# Patient Record
(demographics unavailable — no encounter records)

---

## 2024-10-08 NOTE — ASSESSMENT
[FreeTextEntry1] : Marva is a 12 year old male who is 6 weeks post op from his drainage of a perianal abscess with seton placement. EUA revealed an external sinus, probing revealed a submucosal fistula tract in the L lateral canal, some expression of pus. Seton placed into the tract and secured with silk ties. He is doing well since the procedure and continues to have drainage from the site. I counseled Marva and his mother and informed them that the seton appears intact without any concerns upon exam today along with a new open area in the right buttock that is draining pus. I explained the goal of allowing the wounds to drain and heal to avoid developing a significant fluid collection in the future. I have advised Marva to take warm showers with the water directly hitting the affected regions until he is able to perform warm soaks in the bath at home 1-2 daily. I will also prescribe the family a new 10-day course of antibiotics to remain compliant with at home. After our discussion, Marva and mom indicated their understanding and are in agreement with the discussed plan. We will follow up in two weeks after the MRE for a wound check and to review the results. They have my information and know to contact me sooner with any questions or concerns.

## 2024-10-08 NOTE — REASON FOR VISIT
[Patient] : patient [Mother] : mother [Medical Records] : medical records [____ Week(s)] : [unfilled] week(s)  [Other: ____] : [unfilled] [de-identified] : 8-27-24 [de-identified] : Dr Lee [de-identified] : Marva is a 12 year old male who is here today for follow up. He is 6 weeks post op from drainage of perianal abscess with seton placement. EUA revealed an external sinus, probing revealed a submucosal fistula tract in the L lateral canal, some expression of pus. Seton placed into the tract and secured with silk ties. MRE/MRI done inpt not concerning for IBD, A 7.1 x 2.1 x 5.4 cm left perianal abscess which extends to the left medial gluteal fold was noted with no definite tract or fistula was identified on this study. He has been doing well since the procedure and reports persistent drainage from the site, which has improved overall. He has not had any fevers or changes in energy levels. His bowel movements remain normal without any issues.

## 2024-10-08 NOTE — CONSULT LETTER
[Dear  ___] : Dear  [unfilled], [Courtesy Letter:] : I had the pleasure of seeing your patient, [unfilled], in my office today. [Please see my note below.] : Please see my note below. [Consult Closing:] : Thank you very much for allowing me to participate in the care of this patient.  If you have any questions, please do not hesitate to contact me. [Sincerely,] : Sincerely, [FreeTextEntry2] : Dr Rubio [FreeTextEntry3] : Myriam Lee MD  Division of Pediatric, General, Thoracic and Endoscopic Surgery  [facility]

## 2024-10-08 NOTE — ADDENDUM
[FreeTextEntry1] : Documented by Juan Manuel Spangler acting as a scribe for Dr. Lee on 10/08/2024.   All medical record entries made by the Scribe were at my, Dr. Lee, direction and personally dictated by me on 10/08/2024. I have reviewed the chart and agree that the record accurately reflects my personal performances of the history, physical exam, assessment and plan. I have also personally directed, reviewed, and agree with the instructions.

## 2024-10-08 NOTE — PHYSICAL EXAM
[NL] : grossly intact [TextBox_59] : 1 x 3 cm patch of granulation tissue appreciated on the left medial butt cheek inferior to the seton, on the right medial buttock there is a 5 mm opening that draining pus, seton in place

## 2024-10-30 NOTE — PHYSICAL EXAM
[NL] : grossly intact [TextBox_59] : 1 x 3 cm patch of granulation tissue appreciated on the left medial buttock inferior to the seton, seton in place on the left side, on the right medial buttock there is a 5 mm opening that draining pus (no seton in this location).

## 2024-10-30 NOTE — ADDENDUM
[FreeTextEntry1] : Documented by Juan Manuel Spangler acting as a scribe for Dr. Lee on 10/21/2024.   All medical record entries made by the Scribe were at my, Dr. Lee, direction and personally dictated by me on 10/21/2024. I have reviewed the chart and agree that the record accurately reflects my personal performances of the history, physical exam, assessment and plan. I have also personally directed, reviewed, and agree with the instructions.

## 2024-10-30 NOTE — CONSULT LETTER
[Dear  ___] : Dear  [unfilled], [Consult Letter:] : I had the pleasure of evaluating your patient, [unfilled]. [Please see my note below.] : Please see my note below. [Consult Closing:] : Thank you very much for allowing me to participate in the care of this patient.  If you have any questions, please do not hesitate to contact me. [Sincerely,] : Sincerely, [FreeTextEntry2] : Jermaine Rubio MD [FreeTextEntry3] : Myriam Lee MD Division of Pediatric, General, Thoracic and Endoscopic Surgery [facility]

## 2024-10-30 NOTE — CONSULT LETTER
[Dear  ___] : Dear  [unfilled], [Consult Letter:] : I had the pleasure of evaluating your patient, [unfilled]. [Please see my note below.] : Please see my note below. [Consult Closing:] : Thank you very much for allowing me to participate in the care of this patient.  If you have any questions, please do not hesitate to contact me. [Sincerely,] : Sincerely, [FreeTextEntry2] : Jermaine Rubio MD [FreeTextEntry3] : Myriam Lee MD Division of Pediatric, General, Thoracic and Endoscopic Surgery [facility]

## 2024-10-30 NOTE — REASON FOR VISIT
[Patient] : patient [Mother] : mother [Other: ____] : [unfilled] [____ Month(s)] : [unfilled] month(s)  [Medical Records] : medical records [de-identified] : 08/27/24 [de-identified] : Dr. Lee [de-identified] : Marva is a 12 year old male who underwent a drainage of perianal abscess with seton placement on 8/27/24, here today to follow up after a new opening was recently appreciated in his right buttock two weeks ago. Since then, the family has completed the MRE and Marva has completed the course of antibiotics (cipro and flagyl) that was prescribed. He denies any significant pain but notes persistent purulent drainage. He has not had any fevers or emesis.

## 2024-10-30 NOTE — REASON FOR VISIT
[Patient] : patient [Mother] : mother [Other: ____] : [unfilled] [____ Month(s)] : [unfilled] month(s)  [Medical Records] : medical records [de-identified] : 08/27/24 [de-identified] : Dr. Lee [de-identified] : Marva is a 12 year old male who underwent a drainage of perianal abscess with seton placement on 8/27/24, here today to follow up after a new opening was recently appreciated in his right buttock two weeks ago. Since then, the family has completed the MRE and Marva has completed the course of antibiotics (cipro and flagyl) that was prescribed. He denies any significant pain but notes persistent purulent drainage. He has not had any fevers or emesis.

## 2024-10-30 NOTE — ASSESSMENT
[FreeTextEntry1] : Marva is a 12 year old male who underwent a drainage of perianal abscess with seton placement on 8/27/24. At this time, Marva continues to have purulent drainage from his perineal area. I explained to mom and Marva that the perianal exam appears to be unchanged in comparison to their last visit on 10/8 and it is unclear whether there is an opening within the granulation tissue on the left medial buttock. The opening on the right buttock remains unchanged. I again reviewed the goal of allowing all wounds to drain completely and heal, but informed mom that a repeat EUA with possible additional seton placement may be warranted to further examine the wounds. I will wait to receive a final read for the recent MRE completed before reaching out to mom later this week to discuss next steps. Mom indicated her understanding and we will be in touch. She has my information and knows to contact me sooner with any questions or concerns.

## 2024-10-31 NOTE — HISTORY OF PRESENT ILLNESS
[de-identified] : Marva is a 12-year-old male with sickle cell trait newly diagnosed with perianal Crohn's disease.  Marva presented to [facility] in [place] with a perianal abscess. MRI with IV contrast (no oral contrast) with a 7.1 x 2.1 x 5.4 cm left perianal abscess which extended to the left medial gluteal fold with no definite tract or fistula. Underwent drainage with seton placement in submucosal fistula tract in the L lateral canal. During admission CBC with WBC elevated to 14, anemic to 10.6. CRP elevated to 81. Discharged home on Augmentin. Culture grew staph lugdunensis, resistant to penicillin and transitioned to Bactrim x2 weeks. Underwent EGD/colonoscopy on 24 with patchy erythema in the stomach and scattered aphthous ulcers in the transverse colon. Histopathology unremarkable. Repeat CRP 16. CMP with albumin 3.9. Saw surgery again on 10/8 with continued drainage from left perianal area and a new opening on the right buttock draining purulent fluid. Restarted 10-day course of cipro flagyl. MRE on 10/18 with trace wall thickening and mucosal enhancement of the terminal ileum with evidence of diffusion restriction and mild periileal fibrofatty proliferation. Significant resolution of the left perianal abscess with seton in place which near circumferentially surrounds the anus. There is an intersphincteric enhancing tract which originates at 2:00 and extends caudad 3 cm to the left  crease. A 1 x 0.2 cm fluid collection is present.  Currently with daily drainage in his underwear. Has pain when sitting through school but not missing school. Stools daily [place] 3-4, sometimes sees blood with wiping. No abdominal pain or emesis. Eating well without issue. No unexplained fevers, mouth sores, rashes, or joint pain. No family history of IBD or autoimmune diseaes.

## 2024-10-31 NOTE — PHYSICAL EXAM
[Well Nourished] : well nourished [NAD] : in no acute distress [EOMI] : ~T the extraocular movements were normal and intact [Moist & Pink Mucous Membranes] : moist and pink mucous membranes [CTAB] : lungs clear to auscultation bilaterally [Regular Rate and Rhythm] : regular rate and rhythm [Normal S1, S2] : normal S1 and S2 [Soft] : soft  [Normal Bowel Sounds] : normal bowel sounds [No HSM] : no hepatosplenomegaly appreciated [Normal Tone] : normal tone [Well-Perfused] : well-perfused [Interactive] : interactive [icteric] : anicteric [Oral Ulcers] : no oral ulcers [Respiratory Distress] : no respiratory distress  [Distended] : non distended [Tender] : non tender [Edema] : no edema [Cyanosis] : no cyanosis [Rash] : no rash [Jaundice] : no jaundice [de-identified] : +seton on left side, granulation tissue on left medial buttock, small opening on right medial buttock

## 2024-10-31 NOTE — CONSULT LETTER
Home Sleep Study.  Gave verbal instructions on how to use device and provided written instructions.  Advised pt to call 911 in the event of a medical emergency and [facility] using [facility].  Pt understood and stated they would return the device tomorrow by  0900    [Dear  ___] : Dear  [unfilled], [Consult Letter:] : I had the pleasure of evaluating your patient, [unfilled]. [Please see my note below.] : Please see my note below. [Consult Closing:] : Thank you very much for allowing me to participate in the care of this patient.  If you have any questions, please do not hesitate to contact me. [Sincerely,] : Sincerely, [FreeTextEntry3] : MD Keegan Gaming MD MS [facility]

## 2024-10-31 NOTE — REASON FOR VISIT
[Post Hospitalization Consult] : a post hospitalization consult [Patient] : patient [Parents] : parents [Medical Records] : medical records [FreeTextEntry2] : Crohn's Disease

## 2024-10-31 NOTE — REVIEW OF SYSTEMS
[Fever] : no fever [Redness] : no redness [Discharge] : no discharge [Nasal Discharge] : no nasal discharge [Sore Throat] : no sore throat [Shortness Of Breath] : no shortness of breath [Cough] : no cough [Chest Pain] : no chest pain [Edema] : no edema [Joint Pain] : no joint pain [Frequent Infections] : no frequent infections [Rash] : no rash

## 2024-10-31 NOTE — ASSESSMENT
[Educated Patient & Family about Diagnosis] : educated the patient and family about the diagnosis [FreeTextEntry1] : Marva is a 12-year-old male with newly diagnosed ileal and perianal fistulizing Crohn's disease.  Today we reviewed the IBD diagnosis in detail including pathophysiology, natural history, important extraintestinal manifestations, and risk of complicated disease behavior over time.  We further discussed the treat to target paradigm of therapy and various treatment options that exist today for IBD.  Of the available treatment options, we discussed anti-TNF medications in more detail including both the benefits and the potential risks including but not limited to allergic reaction, infection, development of other autoimmune phenomena and malignancy.  Importantly, TNF inhibitors used as monotherapy have not been associated with increased risk of developing malignancy such as lymphomas.  We further discussed health maintenance aspects of IBD management, importance of discussing mental health issues that may arise, and plan to discuss nutrition at future visits.    Plan: - Start infliximab 10 mg/kg, will obtain insurance approval  - Will obtain growth charts and vaccination records from PMD - Call for questions, concerns, or worsening symptoms  - Close follow up needed

## 2024-12-19 NOTE — ASSESSMENT
[FreeTextEntry1] : Marva is a 12-year-old male with sickle cell trait newly diagnosed with perianal Crohn's disease on Remicade.  Underwent seton placement for perianal abscess 8-27-24.  He is recovering well.  Counselled the family the plan now is to continue treatment for the Crohn's that will be managed by GI and continue to monitor the perianal area.  Once levels are therapeutic and drainage subsides we will discuss seton removal which can be done at the bedside. But he has to keep the area as clean and dry as possible.  What is concerning to us are new onsets of pain and or pressure in the area from new or undrained collections.  Family is aware to reach out w any similar concerns.  Otherwise, f/u in 2 months w Dr Lee sooner if any concerns.  Dr Lee updated

## 2024-12-19 NOTE — PHYSICAL EXAM
[Erythema] : no erythema [Granulation tissue] : granulation tissue [Drainage] : drainage -  [NL] : moves all extremities x4, warm well perfused x4 [TextBox_59] : seton in place, granulation tissue on right where abscess was, above skin level

## 2024-12-19 NOTE — CONSULT LETTER
[Dear  ___] : Dear  [unfilled], [Courtesy Letter:] : I had the pleasure of seeing your patient, [unfilled], in my office today. [Please see my note below.] : Please see my note below. [Consult Closing:] : Thank you very much for allowing me to participate in the care of this patient.  If you have any questions, please do not hesitate to contact me. [Sincerely,] : Sincerely, [FreeTextEntry2] : Dr Rubio [FreeTextEntry3] : Manju Porter  MSN  CPNP Pediatric Nurse Practitioner Department of Pediatric Surgery [facility] phone [phone] fax [phone]

## 2024-12-19 NOTE — HISTORY OF PRESENT ILLNESS
[FreeTextEntry1] : Marva is a 12-year-old male with sickle cell trait newly diagnosed with perianal Crohn's disease on Remicade.  Underwent seton placement for perianal abscess 8-27-24 when he presented to the ER with the abscess.  HE presents for a f/u visit. Seton remains in place.  Endorses intermittent perianal drainage, denies pain or trouble stooling.  HE is aware that if the seton dislodges do not go to the ER unless they have concerns.  they can call the office and we can make a plan

## 2025-01-14 NOTE — ASSESSMENT
[Educated Patient & Family about Diagnosis] : educated the patient and family about the diagnosis [FreeTextEntry1] : Marva is a 13-year-old male with newly diagnosed ileal and perianal fistulizing Crohn's disease. He is now s/p standard induction of infliximab 10 mg/kg/dose with overall improvement of perianal disease with resolved pain and decreased drainage, however still with small amounts of daily drainage. He has had excellent levels of infliximab and will consider spacing out infusions if levels continue to remain >20 and perianal disease continues to heal.   Plan: - Continue infliximab 10 mg/kg, continue [id number] for now - CBC, CMP, CRP, B12, folate, vitamins A, D, E and coags, iron studies, and infliximab level at time of next infusion - To f/u with surgery next month - F/u in 2 months - Call for questions, concerns, or worsening symptoms

## 2025-01-14 NOTE — CONSULT LETTER
[Dear  ___] : Dear  [unfilled], [Consult Letter:] : I had the pleasure of evaluating your patient, [unfilled]. [Please see my note below.] : Please see my note below. [Consult Closing:] : Thank you very much for allowing me to participate in the care of this patient.  If you have any questions, please do not hesitate to contact me. [Sincerely,] : Sincerely, [FreeTextEntry3] : Tena Pitts MD Fellow in the Division of Gastroenterology, Liver Disease and Nutrition at [facility]

## 2025-01-14 NOTE — PHYSICAL EXAM
[Well Nourished] : well nourished [NAD] : in no acute distress [EOMI] : ~T the extraocular movements were normal and intact [Moist & Pink Mucous Membranes] : moist and pink mucous membranes [CTAB] : lungs clear to auscultation bilaterally [Regular Rate and Rhythm] : regular rate and rhythm [Normal S1, S2] : normal S1 and S2 [Soft] : soft  [Normal Bowel Sounds] : normal bowel sounds [No HSM] : no hepatosplenomegaly appreciated [Normal Tone] : normal tone [Well-Perfused] : well-perfused [Interactive] : interactive [Well Developed] : well developed [PERRL] : pupils were equal, round, reactive to light  [icteric] : anicteric [Oral Ulcers] : no oral ulcers [Respiratory Distress] : no respiratory distress  [Distended] : non distended [Tender] : non tender [Edema] : no edema [Cyanosis] : no cyanosis [Rash] : no rash [Jaundice] : no jaundice [de-identified] : +seton on left side, healing granulation tissue on left medial buttock

## 2025-01-14 NOTE — HISTORY OF PRESENT ILLNESS
[Crohn's Disease] : Crohn's Disease  [Ileum] : ileum [Colon] : colon [Perianal Disease] : The patient has perianal disease. [de-identified] : 2024 [de-identified] : Marva is a 13-year-old male with sickle cell trait recently diagnosed with perianal Crohn's disease.  [place] [facility]ianal [place]. MRI with IV contrast (no oral contrast) with a 7.1 x 2.1 x 5.4 cm left perianal abscess which extended to the left medial gluteal fold with no definite tract or fistula. Underwent drainage with seton placement in submucosal fistula tract in the L lateral canal. During admission CBC with WBC elevated to 14, anemic to 10.6. CRP elevated to 81. Discharged home on Augmentin. Culture grew staph lugdunensis, resistant to penicillin and transitioned to Bactrim x2 weeks. Underwent EGD/colonoscopy on 24 with patchy erythema in the stomach and scattered aphthous ulcers in the transverse colon. Histopathology unremarkable. Repeat CRP 16. CMP with albumin 3.9. Saw surgery again on 10/8 with continued drainage from left perianal area and a new opening on the right buttock draining purulent fluid. Restarted 10-day course of cipro flagyl. MRE on 10/18 with trace wall thickening and mucosal enhancement of the terminal ileum with evidence of diffusion restriction and mild periileal fibrofatty proliferation. Significant resolution of the left perianal abscess with seton in place. There was an intersphincteric enhancing tract which originated at 2:00 and extended caudad 3 cm to the left jesika crease. A 1 x 0.2 cm fluid collection was present. Constellation of findings consistent with Crohn's disease and he was started on infliximab 10 mg/kg/dose (800 mg), first dose given . Level at time of second infusion was 69, level at time of third infusion was 32. He saw surgery again on , with plan to keep seton in place as he continued to have intermittent drainage.   Interval history: He is now s/p standard induction of infliximab, third dose given on . Currently with daily drainage in his underwear however overall improved. He's been more comfortable sitting and no issues with school. Stools daily [place] 4. No longer seeing blood with wiping. No abdominal pain or emesis. Eating well without issue. No unexplained fevers, mouth sores, rashes, or joint pain.

## 2025-02-13 NOTE — PHYSICAL EXAM
[NL] : grossly intact [TextBox_59] : Seton in place, left-sided perianal skin with stellate scar (the scar is overlying the previous area of granulation tissue), this left perianal area is improved in appearance; there is a 5 mm opening in the midline of the perineum with drainage

## 2025-02-13 NOTE — CONSULT LETTER
[Dear  ___] : Dear  [unfilled], [Courtesy Letter:] : I had the pleasure of seeing your patient, [unfilled], in my office today. [Please see my note below.] : Please see my note below. [Consult Closing:] : Thank you very much for allowing me to participate in the care of this patient.  If you have any questions, please do not hesitate to contact me. [Sincerely,] : Sincerely, [FreeTextEntry2] : Dr Rubio [FreeTextEntry3] : Myriam Lee MD  Division of Pediatric, General, Thoracic and Endoscopic Surgery  [facility]

## 2025-02-13 NOTE — ASSESSMENT
[FreeTextEntry1] : Marva is a 13-year-old male with sickle cell trait and recently diagnosed perianal Crohn's disease on Remicade infusions.  He underwent seton placement for a large perianal abscess on 8/27/24. He is doing well with the seton in place and an improvement in the purulent drainage, especially after starting Remicade. On exam, the seton appears to be intact. The left perianal skin appears improved as it is now a stellate scar as opposed to raw granulation tissue.   There is no drainage from the left-sided perianal skin. There is a persistent 5 mm opening in the perinem with drainage.  Given his progress thus far, I reviewed the role of the seton to allow for adequate drainage of his fistula to avoid any worsening infection in the future. Moving forward, I advised the family to remain compliant with the Remicade treatment and to follow up with me in three months for another wound check. They understand and agree to follow up. They have my information and know to contact me sooner with any questions or concerns.

## 2025-02-13 NOTE — ADDENDUM
[FreeTextEntry1] : Documented by Juan Manuel Spangler acting as a scribe for Dr. Lee on 02/13/2025.   All medical record entries made by the Scribe were at my, Dr. Lee, direction and personally dictated by me on 02/13/2025. I have reviewed the chart and agree that the record accurately reflects my personal performances of the history, physical exam, assessment and plan. I have also personally directed, reviewed, and agree with the instructions.

## 2025-02-13 NOTE — REASON FOR VISIT
[Follow-up - Scheduled] : a follow-up, scheduled visit for [Other: ____] : [unfilled] [Patient] : patient [Mother] : mother [Medical Records] : medical records [FreeTextEntry4] : Dr. Jermaine Rubio

## 2025-02-13 NOTE — HISTORY OF PRESENT ILLNESS
[FreeTextEntry1] : Marva is a 13-year-old male with sickle cell trait recently diagnosed with perianal Crohn's disease on Remicade. He had undergone seton placement for perianal abscess 8-27-24 (this was prior to his diagnosis of Crohn's disease). Marva is here for a routine follow up visit. The seton remains in place and he denies any associated pain or discomfort. He still appreciates intermittent drainage from the region, which he and his mother feel is less quantity in general. He is having normal bowel movements daily without any issues. He continues to follow up with Peds GI and is currently getting treatment with Remicade every 4 weeks; next infusion scheduled for 2/28/25.

## 2025-02-13 NOTE — CONSULT LETTER
[Dear  ___] : Dear  [unfilled], [Courtesy Letter:] : I had the pleasure of seeing your patient, [unfilled], in my office today. [Please see my note below.] : Please see my note below. [Consult Closing:] : Thank you very much for allowing me to participate in the care of this patient.  If you have any questions, please do not hesitate to contact me. [Sincerely,] : Sincerely, [FreeTextEntry2] : Dr Rubio [FreeTextEntry3] : Myriam Lee MD  Division of Pediatric, General, Thoracic and Endoscopic Surgery  [facility]

## 2025-03-18 NOTE — ASSESSMENT
[Educated Patient & Family about Diagnosis] : educated the patient and family about the diagnosis [FreeTextEntry1] : Marva is a 13-year-old male with perianal fistulizing Crohn's disease with seton in place doing well on infliximab 10 mg/kg/dose every 4 weeks with overall improvement of perianal disease with resolved pain and decreased drainage, however still with small amounts of daily drainage. He has had excellent levels of infliximab and will consider spacing out infusions if levels continue to remain >20. Also with JORY now s/p 1 dose of Venofer with improved energy.   Plan: - Continue infliximab 10 mg/kg, continue [id number] for now, consider spacing after level results from next infusion  - Venofer dose #2 with next infusion  - To f/u with surgery in May - F/u in 3 months - Call for questions, concerns, or worsening symptom

## 2025-03-18 NOTE — PHYSICAL EXAM
[Well Developed] : well developed [Well Nourished] : well nourished [NAD] : in no acute distress [EOMI] : ~T the extraocular movements were normal and intact [Moist & Pink Mucous Membranes] : moist and pink mucous membranes [CTAB] : lungs clear to auscultation bilaterally [Regular Rate and Rhythm] : regular rate and rhythm [Normal S1, S2] : normal S1 and S2 [Soft] : soft  [Normal Bowel Sounds] : normal bowel sounds [No HSM] : no hepatosplenomegaly appreciated [Normal Tone] : normal tone [Well-Perfused] : well-perfused [Interactive] : interactive [icteric] : anicteric [Respiratory Distress] : no respiratory distress  [Distended] : non distended [Tender] : non tender [Edema] : no edema [Cyanosis] : no cyanosis [Rash] : no rash [Jaundice] : no jaundice [de-identified] : +seton, white drainage seen from small opening in the perineum, scar on left side

## 2025-03-18 NOTE — HISTORY OF PRESENT ILLNESS
[Crohn's Disease] : Crohn's Disease  [Ileum] : ileum [Colon] : colon [Perianal Disease] : The patient has perianal disease. [de-identified] : 2024 [de-identified] : Marva is a 13-year-old male with sickle cell trait and Crohn's disease on infliximab every 4 weeks here for follow-up.   [place] [facility] [place] with a perianal abscess. MRI with IV contrast (no oral contrast) with a 7.1 x2.1 x 5.4 cm left perianal abscess which extended to the left medial gluteal fold with no definite tract or fistula. Underwent drainage with seton placement in submucosal fistula tract in the L lateral canal. During admission CBC with WBC elevated to 14, anemic to 10.6. CRP elevated to 81. Completed antibiotic course.   Underwent EGD/colonoscopy on 24 with patchy erythema in the stomach and scattered aphthous ulcers in the transverse colon. Histopathology unremarkable. Repeat CRP 16. CMP with albumin 3.9. MRE on 10/18 with trace wall thickening and mucosal enhancement of the terminal ileum with evidence of diffusion restriction and mild periileal fibrofatty proliferation. Significant resolution of the left perianal abscess with seton in place. There was an intersphincteric enhancing tract which originated at 2:00 and extended caudad 3 cm to the left jesika crease. A 1 x 0.2 cm fluid collection was present. Constellation of findings consistent with Crohn's disease and he was started on infliximab 10 mg/kg/dose (800 mg), first dose given . Level at time of second infusion was 69, level at time of third infusion was 32. Completed induction and now receiving infliximab every 4 weeks. S/p 5 doses, last dose . Infliximab level at fourth infusion 29. Given Venofer at last infusion for JORY.   Continues to follow with surgery, last seen . On exam, seton intact, left perianal skin appeared improved with stellate scar as opposed to raw granulation tissue. No drainage seen from the left-sided perianal skin. Persistent 5 mm opening in the perineum with drainage.   Interval history: He's been more comfortable sitting and no issues with school. Overall drainage improved from his bottom with less drainage than a few months ago. Still wearing disposable underwear. Stools daily [place] 3/4. No blood in the stooling or with wiping. No abdominal pain or emesis. Eating well without issue. Energy overall improved. No unexplained fevers, mouth sores, or joint pain. Last labs  with WBC normal, Hb 11.3, platelets 289.

## 2025-03-18 NOTE — END OF VISIT
[] : Fellow [FreeTextEntry3] : This is a 13-year-old male with history of perianal Crohn's disease here for follow-up.  He is currently on infliximab every 4 weeks with improvement of his perianal disease and still has a seton in place which is draining less over time.  He is having no rectal pain and clinically is doing well.  Infliximab levels most recently were 29. On exam, pt is well-appearing, PERRL, oropharynx was nml, no cervical lymphadenopathy, heart RRR, lungs CTAB, abd soft, non-tender,  non-distended with normal BS and no HSM or masses.  Agree with above recommendations that they can space the infliximab to 5 weeks if the levels stay above 20, continue with IV Venofer with infusions and follow-up with surgery clinic.  Recommend a follow-up in 3 months. [Time Spent: ___ minutes] : I have spent [unfilled] minutes of time on the encounter which excludes teaching and separately reported services.

## 2025-03-18 NOTE — HISTORY OF PRESENT ILLNESS
[Crohn's Disease] : Crohn's Disease  [Ileum] : ileum [Colon] : colon [Perianal Disease] : The patient has perianal disease. [de-identified] : 2024 [de-identified] : Marva is a 13-year-old male with sickle cell trait and Crohn's disease on infliximab every 4 weeks here for follow-up.   Marva presented to [facility] in 2024 with a perianal abscess. MRI with IV contrast (no oral contrast) with a 7.1 x2.1 x 5.4 cm left perianal abscess which extended to the left medial gluteal fold with no definite tract or fistula. Underwent drainage with seton placement in submucosal fistula tract in the L lateral canal. During admission CBC with WBC elevated to 14, anemic to 10.6. CRP elevated to 81. Completed antibiotic course.   Underwent EGD/colonoscopy on 24 with patchy erythema in the stomach and scattered aphthous ulcers in the transverse colon. Histopathology unremarkable. Repeat CRP 16. CMP with albumin 3.9. MRE on 10/18 with trace wall thickening and mucosal enhancement of the terminal ileum with evidence of diffusion restriction and mild periileal fibrofatty proliferation. Significant resolution of the left perianal abscess with seton in place. There was an intersphincteric enhancing tract which originated at 2:00 and extended caudad 3 cm to the left jesika crease. A 1 x 0.2 cm fluid collection was present. Constellation of findings consistent with Crohn's disease and he was started on infliximab 10 mg/kg/dose (800 mg), first dose given . Level at time of second infusion was 69, level at time of third infusion was 32. Completed induction and now receiving infliximab every 4 weeks. S/p 5 doses, last dose . Infliximab level at fourth infusion 29. Given Venofer at last infusion for JORY.   Continues to follow with surgery, last seen . On exam, seton intact, left perianal skin appeared improved with stellate scar as opposed to raw granulation tissue. No drainage seen from the left-sided perianal skin. Persistent 5 mm opening in the perineum with drainage.   Interval history: He's been more comfortable sitting and no issues with school. Overall drainage improved from his bottom with less drainage than a few months ago. Still wearing disposable underwear. Stools daily [place] 3/4. No blood in the stooling or with wiping. No abdominal pain or emesis. Eating well without issue. Energy overall improved. No unexplained fevers, mouth sores, or joint pain. Last labs  with WBC normal, Hb 11.3, platelets 289.

## 2025-03-18 NOTE — CONSULT LETTER
[Dear  ___] : Dear  [unfilled], [Consult Letter:] : I had the pleasure of evaluating your patient, [unfilled]. [Please see my note below.] : Please see my note below. [Consult Closing:] : Thank you very much for allowing me to participate in the care of this patient.  If you have any questions, please do not hesitate to contact me. [Sincerely,] : Sincerely, [FreeTextEntry3] : Tena Pitts MD Fellow in the Division of Gastroenterology, Liver Disease and Nutrition at [facility]  Bina Main MD Attending Physician Pediatric Gastroenterology and Nutrition

## 2025-03-18 NOTE — ASSESSMENT
[Educated Patient & Family about Diagnosis] : educated the patient and family about the diagnosis [FreeTextEntry1] : Marva is a 13-year-old male with perianal fistulizing Crohn's disease with seton in place doing well on infliximab 10 mg/kg/dose every 4 weeks with overall improvement of perianal disease with resolved pain and decreased drainage, however still with small amounts of daily drainage. He has had excellent levels of infliximab and will consider spacing out infusions if levels continue to remain >20. Also with JORY now s/p 1 dose of Venofer with improved energy.   Plan: - Continue infliximab 10 mg/kg, continue [id number] for now, consider spacing after level results from next infusion  - Venofer dose #2 with next infusion  - To f/u with surgery in May - F/u in 3 months - Call for questions, concerns, or worsening symptom

## 2025-03-18 NOTE — CONSULT LETTER
[Dear  ___] : Dear  [unfilled], [Consult Letter:] : I had the pleasure of evaluating your patient, [unfilled]. [Please see my note below.] : Please see my note below. [Consult Closing:] : Thank you very much for allowing me to participate in the care of this patient.  If you have any questions, please do not hesitate to contact me. [Sincerely,] : Sincerely, [FreeTextEntry3] : Tena Pitts MD Fellow in the Division of Gastroenterology, Liver Disease and Nutrition at [facility]  Bina Main MD Attending Physician Pediatric Gastroenterology and Nutrition

## 2025-03-18 NOTE — REVIEW OF SYSTEMS
[Fever] : no fever [Fatigue] : no fatigue [Redness] : no redness [Discharge] : no discharge [Nasal Discharge] : no nasal discharge [Oral Ulcer] : no oral ulcer [Shortness Of Breath] : no shortness of breath [Cough] : no cough [Chest Pain] : no chest pain [Joint Pain] : no joint pain [Headache] : no headache [Dizziness] : no dizziness [Bleeding] : no bleeding [Frequent Infections] : no frequent infections [Rash] : no rash

## 2025-03-18 NOTE — PHYSICAL EXAM
[Well Developed] : well developed [Well Nourished] : well nourished [NAD] : in no acute distress [EOMI] : ~T the extraocular movements were normal and intact [Moist & Pink Mucous Membranes] : moist and pink mucous membranes [CTAB] : lungs clear to auscultation bilaterally [Regular Rate and Rhythm] : regular rate and rhythm [Normal S1, S2] : normal S1 and S2 [Soft] : soft  [Normal Bowel Sounds] : normal bowel sounds [No HSM] : no hepatosplenomegaly appreciated [Normal Tone] : normal tone [Well-Perfused] : well-perfused [Interactive] : interactive [icteric] : anicteric [Respiratory Distress] : no respiratory distress  [Distended] : non distended [Tender] : non tender [Edema] : no edema [Cyanosis] : no cyanosis [Rash] : no rash [Jaundice] : no jaundice [de-identified] : +seton, white drainage seen from small opening in the perineum, scar on left side

## 2025-05-08 NOTE — CONSULT LETTER
[Dear  ___] : Dear  [unfilled], [Courtesy Letter:] : I had the pleasure of seeing your patient, [unfilled], in my office today. [Please see my note below.] : Please see my note below. [Consult Closing:] : Thank you very much for allowing me to participate in the care of this patient.  If you have any questions, please do not hesitate to contact me. [Sincerely,] : Sincerely, [FreeTextEntry2] : Dr Rubio [FreeTextEntry3] : Myriam Lee MD  Division of Pediatric, General, Thoracic and Endoscopic Surgery  [facility]

## 2025-05-08 NOTE — HISTORY OF PRESENT ILLNESS
[FreeTextEntry1] : Marva is a 13-year-old male with sickle cell trait recently diagnosed with perianal Crohn's disease on Remicade. He had undergone seton placement for perianal abscess 8-27-24 (this was prior to his diagnosis of Crohn's disease). Marva is here for a routine follow up visit. The seton remains in place and he denies any associated pain or discomfort Today Marva says he is feeling well. Denies any abdominal pain or discomfort. He still reports scant drainage from the area. He has normal bowel movements. He continues with Remicade every 4 weeks with GI. He remains afebrile.

## 2025-06-25 NOTE — ASSESSMENT
[Educated Patient & Family about Diagnosis] : educated the patient and family about the diagnosis [FreeTextEntry1] : Marva is a 13-year-old male with perianal fistulizing Crohn's disease with seton in place doing well on infliximab 10 mg/kg/dose every 5 weeks with overall improvement of perianal disease with resolved pain and decreased drainage, however still with small amounts of daily drainage. He has had excellent levels of infliximab and therefore interval spaced to every 5 weeks. He also remains anemic despite iron infusions and normalization of iron studies. Will refer to hematology.  Plan: - Continue infliximab 800 [id number] for now - To f/u with surgery in August - Referred to hematology for anemia  - Restart weekly vitamin D, will repeat level in 2 months - F/u in 3-4 months - Call for questions, concerns, or worsening symptom

## 2025-06-25 NOTE — CONSULT LETTER
[Dear  ___] : Dear  [unfilled], [Consult Letter:] : I had the pleasure of evaluating your patient, [unfilled]. [Please see my note below.] : Please see my note below. [Consult Closing:] : Thank you very much for allowing me to participate in the care of this patient.  If you have any questions, please do not hesitate to contact me. [Sincerely,] : Sincerely, [FreeTextEntry3] : Tena Pitts MD Fellow in the Division of Gastroenterology, Liver Disease and Nutrition at [facility]

## 2025-06-25 NOTE — PHYSICAL EXAM
[Well Developed] : well developed [NAD] : in no acute distress [PERRL] : pupils were equal, round, reactive to light  [icteric] : anicteric [Moist & Pink Mucous Membranes] : moist and pink mucous membranes [CTAB] : lungs clear to auscultation bilaterally [Respiratory Distress] : no respiratory distress  [Regular Rate and Rhythm] : regular rate and rhythm [Normal S1, S2] : normal S1 and S2 [Soft] : soft  [Distended] : non distended [Tender] : non tender [Normal Bowel Sounds] : normal bowel sounds [No HSM] : no hepatosplenomegaly appreciated [Normal Tone] : normal tone [Well-Perfused] : well-perfused [Edema] : no edema [Cyanosis] : no cyanosis [Rash] : no rash [Jaundice] : no jaundice [Interactive] : interactive [de-identified] : seton in place with healing of left perianal skin

## 2025-06-25 NOTE — REVIEW OF SYSTEMS
[Fever] : no fever [Fatigue] : no fatigue [Discharge] : no discharge [Nasal Discharge] : no nasal discharge [Chest Pain] : no chest pain [Weakness] : no weakness [Bleeding] : no bleeding [Anemia] : anemia [Frequent Infections] : no frequent infections [Rash] : no rash

## 2025-06-25 NOTE — HISTORY OF PRESENT ILLNESS
[Crohn's Disease] : Crohn's Disease  [Ileum] : ileum [Colon] : colon [Perianal Disease] : The patient has perianal disease. [de-identified] : 2024 [de-identified] : Marva is a 13-year-old male with sickle cell trait and Crohn's disease on infliximab every 5 weeks here for follow-up.   Marva presented to [facility] [place] in 2024 with a perianal abscess. MRI with IV contrast (no oral contrast) with a 7.1 x2.1 x 5.4 cm left perianal abscess which extended to the left medial gluteal fold with no definite tract or fistula. Underwent drainage with seton placement in submucosal fistula tract in the L lateral canal. During admission CBC with WBC elevated to 14, anemic to 10.6. CRP elevated to 81. Completed antibiotic course.   Underwent EGD/colonoscopy on 24 with patchy erythema in the stomach and scattered aphthous ulcers in the transverse colon. Histopathology unremarkable. Repeat CRP 16. CMP with albumin 3.9. MRE on 10/18 with trace wall thickening and mucosal enhancement of the terminal ileum with evidence of diffusion restriction and mild periileal fibrofatty proliferation. Significant resolution of the left perianal abscess with seton in place. There was an intersphincteric enhancing tract which originated at 2:00 and extended caudad 3 cm to the left jesika crease. A 1 x 0.2 cm fluid collection was present. Constellation of findings consistent with Crohn's disease and he was started on infliximab 10 mg/kg/dose (800 mg), first dose given . Initially maintenance doses given every 4 weeks. Levels have remained between 29 to 39 and therefore spaced to every 5 weeks. Last infusion on . S/p Venofer, given a total of 4 doses. Most recent labs in May with normal WBC, Hb 10.8, MCV low at 66, platelets 260, normal iron studies, ferritin 124, CMP unremarkable with albumin 4.1, CRP <3.  Continues to follow with surgery, last seen last month. On exam, the seton appeared to be intact. The left perianal skin improved with stellate scar as opposed to raw granulation tissue. No drainage from the left-sided perianal skin and improvement in the perianal skin. Recommend continuing Remicade treatment and to leave the seton in place. Plan for follow up in three months for another wound check.   Interval history: Broke his right foot in May playing basketball and fell the wrong way. No abdominal pain or diarrhea. Stooling once a day, [place] 4. No blood or mucus in the stool. Eating well without issue. Seton still in place, drainage amount significantly improved. No pain in his bottom. No pain with sitting. No fevers, joint pain, rashes, or mouth sores.

## 2025-07-01 NOTE — REASON FOR VISIT
[New Patient/Consultation] : a new patient/consultation for [Patient] : patient [Mother] : mother [Medical Records] : medical records [FreeTextEntry2] : anemia

## 2025-07-01 NOTE — FAMILY HISTORY
[Age ___] : Age: [unfilled] [Healthy] : healthy [Half] : half sister [FreeTextEntry2] : HTN [de-identified] : ? sickle cell trait

## 2025-07-01 NOTE — REVIEW OF SYSTEMS
[Anemia] : anemia [Negative] : Allergic/Immunologic [Fatigue] : no fatigue [Epistaxis] : no epistaxis [Pallor] : no pallor [Bleeding] : no bleeding [Bruising] : no bruising [Dyspnea] : no dyspnea [Syncope] : no syncope [Abdominal Pain] : no abdominal pain

## 2025-07-01 NOTE — RESULTS/DATA
[FreeTextEntry1] :  Peripheral blood smear reviewed with :   RBCs: Normal morphology. Microcytosis.    WBCs: Normal morphology. No blasts visualized.   Platelets: Normal morphology.

## 2025-07-01 NOTE — PHYSICAL EXAM
[No focal deficits] : no focal deficits [Normal] : affect appropriate [de-identified] : R foot in boot

## 2025-07-01 NOTE — PHYSICAL EXAM
[No focal deficits] : no focal deficits [Normal] : affect appropriate [de-identified] : R foot in boot

## 2025-07-01 NOTE — HISTORY OF PRESENT ILLNESS
[de-identified] : Marva 13 year old male with Sickle Cell trait, and Crohn's Disease of ileum and colon with perianal disease on infliximab q 5 weeks. He has a history of perianal abscess for which he has a seton drain in place. He is referred for evaluation of anemia that has persisted despite oral iron, now with normal iron studies. CBC obtained 5/2/25 showed Hgb 10.8, MCV 66.6, RDW 16.3, Plt 260k.   Diagnosed with Crohn's Disease in October. Had abscess in August and had surgery for seton placement. Gets infliximab every 5 weeks. Has sickle cell trait.  No cough cold or fever. Energy level is baseline. No dizziness or headaches. No syncope. No sports recently due to broken R leg, but was playing basketball prior without issue.  Eats varied diet, including red meat. Eats some vegetables. Eggs. No visible blood in stool. Stools one time per day, formed. No straining. No nosebleeds or bruising.   He was treated by GI with IV iron x 3 doses (last done June 6).

## 2025-07-01 NOTE — HISTORY OF PRESENT ILLNESS
[de-identified] : Marva 13 year old male with Sickle Cell trait, and Crohn's Disease of ileum and colon with perianal disease on infliximab q 5 weeks. He has a history of perianal abscess for which he has a seton drain in place. He is referred for evaluation of anemia that has persisted despite oral iron, now with normal iron studies. CBC obtained 5/2/25 showed Hgb 10.8, MCV 66.6, RDW 16.3, Plt 260k.   Diagnosed with Crohn's Disease in October. Had abscess in August and had surgery for seton placement. Gets infliximab every 5 weeks. Has sickle cell trait.  No cough cold or fever. Energy level is baseline. No dizziness or headaches. No syncope. No sports recently due to broken R leg, but was playing basketball prior without issue.  Eats varied diet, including red meat. Eats some vegetables. Eggs. No visible blood in stool. Stools one time per day, formed. No straining. No nosebleeds or bruising.   He was treated by GI with IV iron x 3 doses (last done June 6).